# Patient Record
Sex: FEMALE | Race: BLACK OR AFRICAN AMERICAN | NOT HISPANIC OR LATINO | Employment: FULL TIME | URBAN - METROPOLITAN AREA
[De-identification: names, ages, dates, MRNs, and addresses within clinical notes are randomized per-mention and may not be internally consistent; named-entity substitution may affect disease eponyms.]

---

## 2018-09-04 ENCOUNTER — EVALUATION (OUTPATIENT)
Dept: PHYSICAL THERAPY | Facility: CLINIC | Age: 58
End: 2018-09-04
Payer: COMMERCIAL

## 2018-09-04 DIAGNOSIS — R53.1 WEAKNESS GENERALIZED: ICD-10-CM

## 2018-09-04 DIAGNOSIS — M54.12 CERVICAL RADICULOPATHY: Primary | ICD-10-CM

## 2018-09-04 PROCEDURE — 97162 PT EVAL MOD COMPLEX 30 MIN: CPT

## 2018-09-04 PROCEDURE — G8984 CARRY CURRENT STATUS: HCPCS

## 2018-09-04 PROCEDURE — 97112 NEUROMUSCULAR REEDUCATION: CPT

## 2018-09-04 PROCEDURE — G8985 CARRY GOAL STATUS: HCPCS

## 2018-09-04 RX ORDER — NAPROXEN 500 MG/1
250 TABLET ORAL 2 TIMES DAILY PRN
COMMUNITY

## 2018-09-04 RX ORDER — ESZOPICLONE 2 MG/1
1 TABLET, FILM COATED ORAL
COMMUNITY

## 2018-09-04 NOTE — PROGRESS NOTES
PT Evaluation     Today's date: 2018  Patient name: Lizzie Leone  : 1960  MRN: [id number]  Referring provider: Tristan Vasquez  Dx:   Encounter Diagnosis     ICD-10-CM    1  Cervical radiculopathy M54 12                   Assessment  Impairments: abnormal coordination, abnormal gait, abnormal muscle tone, abnormal or restricted ROM, activity intolerance, impaired balance, impaired physical strength, lacks appropriate home exercise program, pain with function, poor posture  and poor body mechanics    Assessment details: Lizzie Leone presents with signs and symptoms consistent with referring diagnosis  she demonstrates pain, decreased strength, decreased ROM, decreased joint mobility, impaired sensation, ambulatory dysfunction and postural  dysfunction  Recommend that she may benefit from PT in this setting in order to address the aforementioned impairments and restore Her ability to perform all prior activities without pain or difficulty  Should you have any questions regarding this patient's care, please contact [phone]    Thank you for your referral      Understanding of Dx/Px/POC: good   Prognosis: good    Goals  Short Term Goals to be completed within 3 weeks  Patient to demo good seated and standing posture without cues from PT 50% of the time  Patient able to improve ROM grossly by 15%  Patient able to demo correct transfer techniques with good body mechanics and min cues from PT  Patient able to tolerate sitting for 20 minutes without complaints of pain    Long Term Goals to be completed within 6 weeks  Patient to demo good seated and standing posture without cues from PT 80% of the time  Patient able to improve ROM grossly by 30%  Patient able to demo correct transfer techniques with good body mechanics and min cues from PT  Patient able to tolerate sitting for 40 minutes without complaints of pain  Patient to be independent with HEP  Patient's FOTO score to improve to 61/100      Plan  Patient would benefit from: skilled physical therapy  Planned modality interventions: thermotherapy: hydrocollator packs, cryotherapy and unattended electrical stimulation  Planned therapy interventions: manual therapy, joint mobilization, abdominal trunk stabilization, activity modification, balance, motor coordination training, neuromuscular re-education, patient education, postural training, body mechanics training, behavior modification, strengthening, stretching, therapeutic exercise, therapeutic activities, flexibility, functional ROM exercises and home exercise program  Frequency: 2x week  Duration in visits: 12  Duration in weeks: 6  Plan of Care beginning date: 2018  Plan of Care expiration date: 10/16/2018  Treatment plan discussed with: patient        Subjective Evaluation    History of Present Illness  Date of onset: 2018  Mechanism of injury: Patient reports that she had progressing R sided "heaviness" and weakness on her R arm and leg approximately 3 months ago with insidious onset  She notices a lot of tension in her shoulders that gets worse with sitting at the computer for her work  She works from home mostly, but she does have to go out into [employer] periodically ( 3x/month) and she does feel unsteady occasionally due to having a couple instances of rolling her R ankle  She presents primarily for the neck pain and R UE symptoms but is also hoping to work on her balance and walking as well as her R leg also feels "heavy"    Recurrent probem    Quality of life: good    Pain  Current pain ratin  At best pain ratin  At worst pain ratin  Quality: radiating, throbbing and tight  Relieving factors: change in position, ice, relaxation, rest and medications  Aggravating factors: sitting and keyboarding (Having to sit at her computer for hours at a time)    Social Support  Lives in: condominium  Lives with: alone    Employment status: working  Exercise history: Patient has had PT in past which has helped      Diagnostic Tests  No diagnostic tests performed  Abnormal MRI: "pinched nerve" in her neck from MRI within past year, but no recent imaging      FCE comments: No recent imaging performed, but has had MRI of brain and spine within past year that showed, per patient, "pinched nerves"Treatments  Previous treatment: physical therapy  Current treatment: physical therapy  Patient Goals  Patient goals for therapy: increased strength and decreased pain          Objective     Postural Observations  Seated posture: poor  Standing posture: poor  Correction of posture: makes symptoms better    Additional Postural Observation Details  (+)Dowager's hump    Active Range of Motion   Cervical/Thoracic Spine   Cervical  Subcranial protraction: Active cervical subcranial protraction: 20%   Flexion: WFL  Left lateral flexion: Neck active lateral bend left: 25% with pain  Right lateral flexion: Neck active lateral bend right: 20% with pain  Left rotation: Neck active rotation left: 50%   Right rotation: Neck active rotation right: 40% with pain    Joint Play Hypomobile: C2, C3, C4, C5, C6, C7 and T1 Pain: C5, C6 and C7     Strength/Myotome Testing     Left Shoulder     Planes of Motion   Flexion: 4   Abduction: 4   Adduction: 4+   External rotation at 0°: 4   Internal rotation at 0°: 4     Right Shoulder     Planes of Motion   Flexion: 4-   Abduction: 4-   Adduction: 4+   External rotation at 0°: 4-   Internal rotation at 0°: 4     Left Elbow   Flexion: 4+  Extension: 4+    Right Elbow   Flexion: 4+  Extension: 4+    Ambulation     Comments   R UE lacks arm swing, Minimal to nil trunk rotation during ambulation, decreased step length, absent push off and heel strike, widened base of support    Functional Assessment     Single Leg Stance   Left single leg stance time: Unable to perform without assist   Right single leg stance time: Unable to perform without assist           Precautions: MS    Daily Treatment Diary     Manual 9/4/18            Flexibility UE nerve glides 10x ea            STM B UT            Joint Mob                                           Exercise Diary  9/4/18            Chin tucks 5"x10            Scap squeezes 5"x10            Posture Re-ed 10 min                                                                                                                                                                                                                                             Modalities

## 2018-09-04 NOTE — LETTER
2018    [address] [place] [address]    Patient: Cindy Us   YOB: 1960   Date of Visit: 2018     Encounter Diagnosis     ICD-10-CM    1  Cervical radiculopathy M54 12    2  Weakness generalized R53 1        Dear Dr Barraza Push:    Please review the attached Plan of Care from [address] recent visit  Please verify that you agree therapy should continue by signing the attached document and sending it back to our office  If you have any questions or concerns, please don't hesitate to call  Sincerely,    Carolina Wise, PT      Referring Provider:      I certify that I have read the below Plan of Care and certify the need for these services furnished under this plan of treatment while under my care  Laine Oh  [address] [address]  V[address]          PT Evaluation     Today's date: 2018  Patient name: Cindy Us  : 1960  MRN: [id number]  Referring provider: Kirby Penn  Dx:   Encounter Diagnosis     ICD-10-CM    1  Cervical radiculopathy M54 12                   Assessment  Impairments: abnormal coordination, abnormal gait, abnormal muscle tone, abnormal or restricted ROM, activity intolerance, impaired balance, impaired physical strength, lacks appropriate home exercise program, pain with function, poor posture  and poor body mechanics    Assessment details: Cindy Us presents with signs and symptoms consistent with referring diagnosis  she demonstrates pain, decreased strength, decreased ROM, decreased joint mobility, impaired sensation, ambulatory dysfunction and postural  dysfunction  Recommend that she may benefit from PT in this setting in order to address the aforementioned impairments and restore Her ability to perform all prior activities without pain or difficulty  Should you have any questions regarding this patient's care, please contact [phone]    Thank you for your referral      Understanding of Dx/Px/POC: good   Prognosis: good    Goals  Short Term Goals to be completed within 3 weeks  Patient to demo good seated and standing posture without cues from PT 50% of the time  Patient able to improve ROM grossly by 15%  Patient able to demo correct transfer techniques with good body mechanics and min cues from PT  Patient able to tolerate sitting for 20 minutes without complaints of pain    Long Term Goals to be completed within 6 weeks  Patient to demo good seated and standing posture without cues from PT 80% of the time  Patient able to improve ROM grossly by 30%  Patient able to demo correct transfer techniques with good body mechanics and min cues from PT  Patient able to tolerate sitting for 40 minutes without complaints of pain  Patient to be independent with HEP  Patient's FOTO score to improve to 61/100      Plan  Patient would benefit from: skilled physical therapy  Planned modality interventions: thermotherapy: hydrocollator packs, cryotherapy and unattended electrical stimulation  Planned therapy interventions: manual therapy, joint mobilization, abdominal trunk stabilization, activity modification, balance, motor coordination training, neuromuscular re-education, patient education, postural training, body mechanics training, behavior modification, strengthening, stretching, therapeutic exercise, therapeutic activities, flexibility, functional ROM exercises and home exercise program  Frequency: 2x week  Duration in visits: 12  Duration in weeks: 6  Plan of Care beginning date: 9/4/2018  Plan of Care expiration date: 10/16/2018  Treatment plan discussed with: patient        Subjective Evaluation    History of Present Illness  Date of onset: 6/4/2018  Mechanism of injury: Patient reports that she had progressing R sided "heaviness" and weakness on her R arm and leg approximately 3 months ago with insidious onset   She notices a lot of tension in her shoulders that gets worse with sitting at the computer for her work  She works from home mostly, but she does have to go out into [place] periodically ( 3x/month) and she does feel unsteady occasionally due to having a couple instances of rolling her R ankle  She presents primarily for the neck pain and R UE symptoms but is also hoping to work on her balance and walking as well as her R leg also feels "heavy"  Recurrent probem    Quality of life: good    Pain  Current pain ratin  At best pain ratin  At worst pain ratin  Quality: radiating, throbbing and tight  Relieving factors: change in position, ice, relaxation, rest and medications  Aggravating factors: sitting and keyboarding (Having to sit at her computer for hours at a time)    Social Support  Lives in: condominium  Lives with: alone    Employment status: working  Exercise history: Patient has had PT in past which has helped      Diagnostic Tests  No diagnostic tests performed  Abnormal MRI: "pinched nerve" in her neck from MRI within past year, but no recent imaging      FCE comments: No recent imaging performed, but has had MRI of brain and spine within past year that showed, per patient, "pinched nerves"Treatments  Previous treatment: physical therapy  Current treatment: physical therapy  Patient Goals  Patient goals for therapy: increased strength and decreased pain          Objective     Postural Observations  Seated posture: poor  Standing posture: poor  Correction of posture: makes symptoms better    Additional Postural Observation Details  (+)Dowager's hump    Active Range of Motion   Cervical/Thoracic Spine   Cervical  Subcranial protraction: Active cervical subcranial protraction: 20%   Flexion: WFL  Left lateral flexion: Neck active lateral bend left: 25% with pain  Right lateral flexion: Neck active lateral bend right: 20% with pain  Left rotation: Neck active rotation left: 50%   Right rotation: Neck active rotation right: 40% with pain    Joint Play Hypomobile: C2, C3, C4, C5, C6, C7 and T1 Pain: C5, C6 and C7     Strength/Myotome Testing     Left Shoulder     Planes of Motion   Flexion: 4   Abduction: 4   Adduction: 4+   External rotation at 0°:  4   Internal rotation at 0°:  4     Right Shoulder     Planes of Motion   Flexion: 4-   Abduction: 4-   Adduction: 4+   External rotation at 0°:  4-   Internal rotation at 0°:  4     Left Elbow   Flexion: 4+  Extension: 4+    Right Elbow   Flexion: 4+  Extension: 4+    Ambulation     Comments   R UE lacks arm swing, Minimal to nil trunk rotation during ambulation, decreased step length, absent push off and heel strike, widened base of support    Functional Assessment     Single Leg Stance   Left single leg stance time: Unable to perform without assist   Right single leg stance time: Unable to perform without assist           Precautions: MS    Daily Treatment Diary     Manual  9/4/18            Flexibility UE nerve glides 10x ea            STM B UT            Joint Mob                                           Exercise Diary  9/4/18            Chin tucks 5"x10            Scap squeezes 5"x10            Posture Re-ed 10 min                                                                                                                                                                                                                                             Modalities

## 2018-09-05 ENCOUNTER — TRANSCRIBE ORDERS (OUTPATIENT)
Dept: PHYSICAL THERAPY | Facility: CLINIC | Age: 58
End: 2018-09-05

## 2018-09-05 DIAGNOSIS — M54.12 CERVICAL RADICULOPATHY: Primary | ICD-10-CM

## 2018-09-06 ENCOUNTER — OFFICE VISIT (OUTPATIENT)
Dept: PHYSICAL THERAPY | Facility: CLINIC | Age: 58
End: 2018-09-06
Payer: COMMERCIAL

## 2018-09-06 DIAGNOSIS — M54.12 CERVICAL RADICULOPATHY: Primary | ICD-10-CM

## 2018-09-06 DIAGNOSIS — R53.1 WEAKNESS GENERALIZED: ICD-10-CM

## 2018-09-06 PROCEDURE — 97140 MANUAL THERAPY 1/> REGIONS: CPT

## 2018-09-06 PROCEDURE — 97110 THERAPEUTIC EXERCISES: CPT

## 2018-09-06 NOTE — PROGRESS NOTES
Daily Note     Today's date: 2018  Patient name: Natali Pino  : 1960  MRN: [id number]  Referring provider: Joi Beasley MD  Dx:   Encounter Diagnosis     ICD-10-CM    1  Cervical radiculopathy M54 12    2  Weakness generalized R53 1                   Subjective: Patient reports that she feels a little less tight today and she has been adjusting her posture throughout her day more which seems to be helping  Pain level today on arrival       Objective: See treatment diary below  Precautions: MS    Daily Treatment Diary     Manual  18           Flexibility UE nerve glides 10x ea B UT, scalenes           STM B UT B UT, suboccipital release, R levator scap release, R SCM STM           Joint Mob  PA mobs C/S Gr II, III                                         Exercise Diary  18           Chin tucks 5"x10 5"x10           Scap squeezes 5"x10 5"x 20           Posture Re-ed 10 min            UBE  Rev x5min           Pulleys  x4 min elevation           B UE ER  Red TB 2x15                                                                                                                                                                                                     Modalities              CP  x10min post trx                                           Assessment: Tolerated treatment well and without distress  She requires intermittent rest breaks between exercises and tactile cues to avoid upper trap over-activation during posture exercises  Pain levels decreased by conclusion of session, less TTP to B UT after manual treatment performed  Patient demonstrated fatigue post treatment and would benefit from continued PT  Plan: Continue per plan of care

## 2018-09-11 ENCOUNTER — OFFICE VISIT (OUTPATIENT)
Dept: PHYSICAL THERAPY | Facility: CLINIC | Age: 58
End: 2018-09-11
Payer: COMMERCIAL

## 2018-09-11 DIAGNOSIS — M54.12 CERVICAL RADICULOPATHY: Primary | ICD-10-CM

## 2018-09-11 DIAGNOSIS — R53.1 WEAKNESS GENERALIZED: ICD-10-CM

## 2018-09-11 PROCEDURE — 97110 THERAPEUTIC EXERCISES: CPT

## 2018-09-11 PROCEDURE — 97140 MANUAL THERAPY 1/> REGIONS: CPT

## 2018-09-11 NOTE — PROGRESS NOTES
Daily Note     Today's date: 2018  Patient name: Gerry Darling  : 1960  MRN: [id number]  Referring provider: Buster Sparks MD  Dx:   Encounter Diagnosis     ICD-10-CM    1  Cervical radiculopathy M54 12    2  Weakness generalized R53 1                   Subjective: Patient reports that she has been in a lot of pain over the weekend due to a flare up of gout in her R foot  She had difficulty putting any weight on the R foot  She is now on medication so it is improving, but her lack of mobility over the weekend tightened up her neck  She also reports that her carpal tunnel syndrome has been bad the past few days and is now wearing her wrist brace on the R hand  Objective: See treatment diary below  Precautions: MS    Daily Treatment Diary     Manual  18          Flexibility UE nerve glides 10x ea B UT, scalenes B UT, scalenes          STM B UT B UT, suboccipital release, R levator scap release, R SCM STM B UT, suboccipital release, R levator scap release, R SCM STM          Joint Mob  PA mobs C/S Gr II, III PA mobs C/S Gr II, III                                        Exercise Diary  18          Chin tucks 5"x10 5"x10 5"x15          Scap squeezes 5"x10 5"x 20 5"x20          Posture Re-ed 10 min            UBE  Rev x5min Rev 5 min          Pulleys  x4 min elevation x4 min elevation          B UE ER  Red TB 2x15 Red TB 2x15 in supine                                                                                                                                                                                                    Modalities              CP x10min post trx x10min post trx                                           Assessment: Tolerated treatment well and with minimal distress  She continues to have increased tissue tension in the upper trap musculature, addressed with manual techniques and posture cues    She also complains of paresthesias throughout the entirety of her R hand without centralization or peripherlization patterns occurring throughout session  Performed B UE ER in supine position today, as she was more fatigued and in more pain today due to gout  Patient demonstrated fatigue post treatment and would benefit from continued PT in this setting  Plan: Continue per plan of care

## 2018-09-13 ENCOUNTER — OFFICE VISIT (OUTPATIENT)
Dept: PHYSICAL THERAPY | Facility: CLINIC | Age: 58
End: 2018-09-13
Payer: COMMERCIAL

## 2018-09-13 DIAGNOSIS — R53.1 WEAKNESS GENERALIZED: ICD-10-CM

## 2018-09-13 DIAGNOSIS — M54.12 CERVICAL RADICULOPATHY: Primary | ICD-10-CM

## 2018-09-13 PROCEDURE — 97140 MANUAL THERAPY 1/> REGIONS: CPT

## 2018-09-13 PROCEDURE — 97110 THERAPEUTIC EXERCISES: CPT

## 2018-09-13 NOTE — PROGRESS NOTES
Daily Note     Today's date: 2018  Patient name: Cindy Us  : 1960  MRN: [id number]  Referring provider: Kirby Penn MD  Dx:   Encounter Diagnosis     ICD-10-CM    1  Cervical radiculopathy M54 12    2  Weakness generalized R53 1                   Subjective: Patient reports that she has felt better since last session  She still has some mild numbness in the R hand, but overall still feeling better  Pain on arrival 4/10  Objective: See treatment diary below  Precautions: MS    Daily Treatment Diary     Manual  18         Flexibility UE nerve glides 10x ea B UT, scalenes B UT, scalenes B UT, scalenes, UE nerve glides 15x         STM B UT B UT, suboccipital release, R levator scap release, R SCM STM B UT, suboccipital release, R levator scap release, R SCM STM B UT, suboccipital release, R levator scap release, R SCM STM         Joint Mob  PA mobs C/S Gr II, III PA mobs C/S Gr II, III PA mobs C/S Gr II, III                                       Exercise Diary  18         Chin tucks 5"x10 5"x10 5"x15 5"x15         Scap squeezes 5"x10 5"x 20 5"x20 5"x20         Posture Re-ed 10 min            UBE  Rev x5min Rev 5 min Rev/Fwd 4'/4'         Pulleys  x4 min elevation x4 min elevation x4 min elevation         B UE ER  Red TB 2x15 Red TB 2x15 in supine Red TB 2x15                                                                                                                                                                                                   Modalities              CP x10min post trx x10min post trx                                           Assessment: Tolerated treatment well and without distress  She was able to tolerate longer time on the UBE without excessive fatigue  She responds well to manual treatment and nerve glides  Pain reported as /10 by conclusion of session   Patient demonstrated fatigue post treatment, exhibited good technique with therapeutic exercises and would benefit from continued PT      Plan: Continue per plan of care

## 2018-09-18 ENCOUNTER — OFFICE VISIT (OUTPATIENT)
Dept: PHYSICAL THERAPY | Facility: CLINIC | Age: 58
End: 2018-09-18
Payer: COMMERCIAL

## 2018-09-18 DIAGNOSIS — M54.12 CERVICAL RADICULOPATHY: Primary | ICD-10-CM

## 2018-09-18 DIAGNOSIS — R53.1 WEAKNESS GENERALIZED: ICD-10-CM

## 2018-09-18 PROCEDURE — 97110 THERAPEUTIC EXERCISES: CPT

## 2018-09-18 PROCEDURE — 97140 MANUAL THERAPY 1/> REGIONS: CPT

## 2018-09-18 NOTE — PROGRESS NOTES
Daily Note     Today's date: 2018  Patient name: Natali Pino  : 1960  MRN: [id number]  Referring provider: Joi Beasley MD  Dx:   Encounter Diagnosis     ICD-10-CM    1  Cervical radiculopathy M54 12    2  Weakness generalized R53 1                   Subjective: Patient reports that she still has some mild R hand symptoms, but it is less intense than it was previously  Intensity /10 on arrival        Objective: See treatment diary below  Daily Treatment Diary     Manual  18        Flexibility UE nerve glides 10x ea B UT, scalenes B UT, scalenes B UT, scalenes, UE nerve glides 15x B UT, scalenes, UE nerve glides 15x        STM B UT B UT, suboccipital release, R levator scap release, R SCM STM B UT, suboccipital release, R levator scap release, R SCM STM B UT, suboccipital release, R levator scap release, R SCM STM B UT, suboccipital release, R levator scap release, R SCM STM        Joint Mob  PA mobs C/S Gr II, III PA mobs C/S Gr II, III PA mobs C/S Gr II, III PA mobs C/S Gr III, IV                                      Exercise Diary  18        Chin tucks 5"x10 5"x10 5"x15 5"x15 5"x15        Scap squeezes 5"x10 5"x 20 5"x20 5"x20 5"x15        Posture Re-ed 10 min            UBE  Rev x5min Rev 5 min Rev/Fwd 4'/4' Rev/Fwd 4'/4'        Pulleys  x4 min elevation x4 min elevation x4 min elevation x4 min elevation        B UE ER  Red TB 2x15 Red TB 2x15 in supine Red TB 2x15 Red TB 2x15                                                                                                                                                                                                  Modalities              CP x10min post trx x10min post trx                                           Assessment: Tolerated treatment well   Patient demonstrated fatigue post treatment, exhibited good technique with therapeutic exercises and would benefit from continued PT in this setting  She demonstrates moderate to severe C/S joint hypomoblity       Plan: Continue per plan of care

## 2018-09-20 ENCOUNTER — OFFICE VISIT (OUTPATIENT)
Dept: PHYSICAL THERAPY | Facility: CLINIC | Age: 58
End: 2018-09-20
Payer: COMMERCIAL

## 2018-09-20 DIAGNOSIS — R53.1 WEAKNESS GENERALIZED: ICD-10-CM

## 2018-09-20 DIAGNOSIS — M54.12 CERVICAL RADICULOPATHY: Primary | ICD-10-CM

## 2018-09-20 PROCEDURE — 97140 MANUAL THERAPY 1/> REGIONS: CPT

## 2018-09-20 PROCEDURE — 97110 THERAPEUTIC EXERCISES: CPT

## 2018-09-20 NOTE — PROGRESS NOTES
Daily Note     Today's date: 2018  Patient name: Kellee Marks  : 1960  MRN: [id number]  Referring provider: Alcides Tang MD  Dx:   Encounter Diagnosis     ICD-10-CM    1  Cervical radiculopathy M54 12    2  Weakness generalized R53 1                   Subjective: Patient reports that she can feel her R hand a little more today than usual, but it is still decreased as compared to her L side         Objective: See treatment diary below  Daily Treatment Diary     Manual  18       Flexibility UE nerve glides 10x ea B UT, scalenes B UT, scalenes B UT, scalenes, UE nerve glides 15x B UT, scalenes, UE nerve glides 15x B UT, scalenes, UE nerve glides 15x       STM B UT B UT, suboccipital release, R levator scap release, R SCM STM B UT, suboccipital release, R levator scap release, R SCM STM B UT, suboccipital release, R levator scap release, R SCM STM B UT, suboccipital release, R levator scap release, R SCM STM B UT, suboccipital release, R levator scap release, R SCM STM       Joint Mob  PA mobs C/S Gr II, III PA mobs C/S Gr II, III PA mobs C/S Gr II, III PA mobs C/S Gr III, IV PA mobs C/S Gr III, IV                                     Exercise Diary  18       Chin tucks 5"x10 5"x10 5"x15 5"x15 5"x15 5"x15       Scap squeezes 5"x10 5"x 20 5"x20 5"x20 5"x15 5"x15       Posture Re-ed 10 min            UBE  Rev x5min Rev 5 min Rev/Fwd 4'/4' Rev/Fwd 4'/4' Rev/Fwd 4'/4'       Pulleys  x4 min elevation x4 min elevation x4 min elevation x4 min elevation x4 min elevation       B UE ER  Red TB 2x15 Red TB 2x15 in supine Red TB 2x15 Red TB 2x15 Red TB 2x15                                                                                                                                                                                                 Modalities              CP x10min post trx x10min post trx Assessment: Tolerated treatment well and without distress  Patient demonstrated fatigue post treatment, exhibited good technique with therapeutic exercises and would benefit from continued PT in order to improve posture and decrease pain and muscle tension in neck  Plan: Continue per plan of care

## 2018-09-25 ENCOUNTER — OFFICE VISIT (OUTPATIENT)
Dept: PHYSICAL THERAPY | Facility: CLINIC | Age: 58
End: 2018-09-25
Payer: COMMERCIAL

## 2018-09-25 DIAGNOSIS — R53.1 WEAKNESS GENERALIZED: ICD-10-CM

## 2018-09-25 DIAGNOSIS — M54.12 CERVICAL RADICULOPATHY: Primary | ICD-10-CM

## 2018-09-25 PROCEDURE — 97112 NEUROMUSCULAR REEDUCATION: CPT

## 2018-09-25 PROCEDURE — 97110 THERAPEUTIC EXERCISES: CPT

## 2018-09-25 NOTE — PROGRESS NOTES
Daily Note     Today's date: 2018  Patient name: Wade Montgomery  : 1960  MRN: [id number]  Referring provider: Selma Epley, MD  Dx:   Encounter Diagnosis     ICD-10-CM    1  Cervical radiculopathy M54 12    2  Weakness generalized R53 1                   Subjective: Patient reports that she feels like she is improving slowly  She has some sensation in her R hand, though had some tingling in the R side of her neck  Overall she is happy with her progress       Objective: See treatment diary below  Daily Treatment Diary     Manual  18      Flexibility UE nerve glides 10x ea B UT, scalenes B UT, scalenes B UT, scalenes, UE nerve glides 15x B UT, scalenes, UE nerve glides 15x B UT, scalenes, UE nerve glides 15x       STM B UT B UT, suboccipital release, R levator scap release, R SCM STM B UT, suboccipital release, R levator scap release, R SCM STM B UT, suboccipital release, R levator scap release, R SCM STM B UT, suboccipital release, R levator scap release, R SCM STM B UT, suboccipital release, R levator scap release, R SCM STM       Joint Mob  PA mobs C/S Gr II, III PA mobs C/S Gr II, III PA mobs C/S Gr II, III PA mobs C/S Gr III, IV PA mobs C/S Gr III, IV                                     Exercise Diary  18      Chin tucks 5"x10 5"x10 5"x15 5"x15 5"x15 5"x15 5"x15      Scap squeezes 5"x10 5"x 20 5"x20 5"x20 5"x15 5"x15 5"x15      Posture Re-ed 10 min            UBE  Rev x5min Rev 5 min Rev/Fwd 4'/4' Rev/Fwd 4'/4' Rev/Fwd 4'/4' Rev/Fwd 4'/4'      Pulleys  x4 min elevation x4 min elevation x4 min elevation x4 min elevation x4 min elevation x4 min elevation      B UE ER  Red TB 2x15 Red TB 2x15 in supine Red TB 2x15 Red TB 2x15 Red TB 2x15 Red TB 2x15      B UE Abd       supine Red 2x10      Rows       Tcord 3x10      Shoulder extension with scap squeeze       3" holdx 20, yellow Modalities              CP x10min post trx x10min post trx                                           Assessment: Tolerated treatment well  Patient demonstrated fatigue post treatment, exhibited good technique with therapeutic exercises and would benefit from continued PT in this setting to increase endurance of postural muscles  Plan: Continue per plan of care

## 2018-09-27 ENCOUNTER — APPOINTMENT (OUTPATIENT)
Dept: PHYSICAL THERAPY | Facility: CLINIC | Age: 58
End: 2018-09-27
Payer: COMMERCIAL

## 2018-10-02 ENCOUNTER — APPOINTMENT (OUTPATIENT)
Dept: PHYSICAL THERAPY | Facility: CLINIC | Age: 58
End: 2018-10-02
Payer: COMMERCIAL

## 2018-10-04 ENCOUNTER — OFFICE VISIT (OUTPATIENT)
Dept: PHYSICAL THERAPY | Facility: CLINIC | Age: 58
End: 2018-10-04
Payer: COMMERCIAL

## 2018-10-04 DIAGNOSIS — M54.12 CERVICAL RADICULOPATHY: Primary | ICD-10-CM

## 2018-10-04 DIAGNOSIS — R53.1 WEAKNESS GENERALIZED: ICD-10-CM

## 2018-10-04 PROCEDURE — 97110 THERAPEUTIC EXERCISES: CPT

## 2018-10-04 PROCEDURE — 97140 MANUAL THERAPY 1/> REGIONS: CPT

## 2018-10-04 NOTE — PROGRESS NOTES
Daily Note     Today's date: 10/4/2018  Patient name: Jim Pardo  : 1960  MRN: [id number]  Referring provider: Ann Espinosa MD  Dx:   Encounter Diagnosis     ICD-10-CM    1  Cervical radiculopathy M54 12    2  Weakness generalized R53 1                   Subjective: Patient reports that she still isn't feeling very well today but is now on antibiotics          Objective: See treatment diary below  Daily Treatment Diary     Manual  9/4/18 9/6/18 9/11/18 9/13/18 9/18/18 9/20/18 9/25/18 10/4/18     Flexibility UE nerve glides 10x ea B UT, scalenes B UT, scalenes B UT, scalenes, UE nerve glides 15x B UT, scalenes, UE nerve glides 15x B UT, scalenes, UE nerve glides 15x  B UT, scalenes, UE nerve glides 15x     STM B UT B UT, suboccipital release, R levator scap release, R SCM STM B UT, suboccipital release, R levator scap release, R SCM STM B UT, suboccipital release, R levator scap release, R SCM STM B UT, suboccipital release, R levator scap release, R SCM STM B UT, suboccipital release, R levator scap release, R SCM STM  B UT, suboccipital release, R levator scap release, R SCM STM     Joint Mob  PA mobs C/S Gr II, III PA mobs C/S Gr II, III PA mobs C/S Gr II, III PA mobs C/S Gr III, IV PA mobs C/S Gr III, IV  PA mobs C/S Gr III, IV                                   Exercise Diary  9/4/18 9/6/18 9/11/18 9/13/18 9/18/18 9/20/18 9/25/18 10/4/18     Chin tucks 5"x10 5"x10 5"x15 5"x15 5"x15 5"x15 5"x15 5"x15     Scap squeezes 5"x10 5"x 20 5"x20 5"x20 5"x15 5"x15 5"x15 5"x15     Posture Re-ed 10 min            UBE  Rev x5min Rev 5 min Rev/Fwd 4'/4' Rev/Fwd 4'/4' Rev/Fwd 4'/4' Rev/Fwd 4'/4' Rev/Fwd 4'/4'     Pulleys  x4 min elevation x4 min elevation x4 min elevation x4 min elevation x4 min elevation x4 min elevation x4 min elevation     B UE ER  Red TB 2x15 Red TB 2x15 in supine Red TB 2x15 Red TB 2x15 Red TB 2x15 Red TB 2x15      B UE Abd       supine Red 2x10      Rows       Tcord 3x10      Shoulder extension with scap squeeze       3" holdx 20, yellow                                                                                                                                                         Modalities              CP x10min post trx x10min post trx                                           Assessment: Tolerated treatment well and without distress  She requested abbreviated session today, as she is not feeling well  She was able to tolerate light TE's and manual treatment  Patient demonstrated fatigue post treatment, exhibited good technique with therapeutic exercises and would benefit from continued PT in this setting, resume other exercises next session as tolerated  Plan: Continue per plan of care

## 2018-10-16 ENCOUNTER — OFFICE VISIT (OUTPATIENT)
Dept: PHYSICAL THERAPY | Facility: CLINIC | Age: 58
End: 2018-10-16
Payer: COMMERCIAL

## 2018-10-16 DIAGNOSIS — R53.1 WEAKNESS GENERALIZED: ICD-10-CM

## 2018-10-16 DIAGNOSIS — M54.12 CERVICAL RADICULOPATHY: Primary | ICD-10-CM

## 2018-10-16 PROCEDURE — 97140 MANUAL THERAPY 1/> REGIONS: CPT

## 2018-10-16 PROCEDURE — 97110 THERAPEUTIC EXERCISES: CPT

## 2018-10-16 NOTE — PROGRESS NOTES
Daily Note     Today's date: 10/16/2018  Patient name: Elisa Wellington  : 1960  MRN: [id number]  Referring provider: Anoop Jacobsen MD  Dx:   Encounter Diagnosis     ICD-10-CM    1  Cervical radiculopathy M54 12    2  Weakness generalized R53 1                   Subjective: Patient reports that she is struggling with another episode of gout, for which she is being treated again, but this time it is affecting the L foot  She has slightly more sensation in her R hand but still gets numbness in the R hand  She also notes that she has been under a lot of stress lately and feels it also contributes to her neck tension and symptoms         Objective: See treatment diary below  Daily Treatment Diary     Manual  9/4/18 9/6/18 9/11/18 9/13/18 9/18/18 9/20/18 9/25/18 10/4/18 10/16/18    Flexibility UE nerve glides 10x ea B UT, scalenes B UT, scalenes B UT, scalenes, UE nerve glides 15x B UT, scalenes, UE nerve glides 15x B UT, scalenes, UE nerve glides 15x  B UT, scalenes, UE nerve glides 15x B UT, scalenes, UE nerve glides 15x    STM B UT B UT, suboccipital release, R levator scap release, R SCM STM B UT, suboccipital release, R levator scap release, R SCM STM B UT, suboccipital release, R levator scap release, R SCM STM B UT, suboccipital release, R levator scap release, R SCM STM B UT, suboccipital release, R levator scap release, R SCM STM  B UT, suboccipital release, R levator scap release, R SCM STM B UT, suboccipital release, R levator scap release, R SCM STM    Joint Mob  PA mobs C/S Gr II, III PA mobs C/S Gr II, III PA mobs C/S Gr II, III PA mobs C/S Gr III, IV PA mobs C/S Gr III, IV  PA mobs C/S Gr III, IV PA mobs C/S Gr III, IV                                  Exercise Diary  18/25/18 10/4/18 10/1618    Chin tucks 5"x10 5"x10 5"x15 5"x15 5"x15 5"x15 5"x15 5"x15 5"x15    Scap squeezes 5"x10 5"x 20 5"x20 5"x20 5"x15 5"x15 5"x15 5"x15 5"x15    Posture Re-ed 10 min UBE  Rev x5min Rev 5 min Rev/Fwd 4'/4' Rev/Fwd 4'/4' Rev/Fwd 4'/4' Rev/Fwd 4'/4' Rev/Fwd 4'/4' Rev/Fwd 4'/4'    Pulleys  x4 min elevation x4 min elevation x4 min elevation x4 min elevation x4 min elevation x4 min elevation x4 min elevation x4 min elevation    B UE ER  Red TB 2x15 Red TB 2x15 in supine Red TB 2x15 Red TB 2x15 Red TB 2x15 Red TB 2x15  Red TB 2x15    B UE Abd       supine Red 2x10  Red TB 2x15    Rows       Tcord 3x10  Tcord 3x10 yellow    Shoulder extension with scap squeeze       3" holdx 20, yellow  3" holdx 20, yellow                                                                                                                                                       Modalities              CP x10min post trx x10min post trx                                           Assessment: Tolerated treatment well and without distress  She reported increase sensation in the R hand after performing chin tucks and manual traction  Patient demonstrated fatigue post treatment and would benefit from continued PT in this setting to improve overall postural endurance and improve overall strength and mobility  Plan: Continue per plan of care

## 2018-10-18 ENCOUNTER — OFFICE VISIT (OUTPATIENT)
Dept: PHYSICAL THERAPY | Facility: CLINIC | Age: 58
End: 2018-10-18
Payer: COMMERCIAL

## 2018-10-18 DIAGNOSIS — R53.1 WEAKNESS GENERALIZED: Primary | ICD-10-CM

## 2018-10-18 DIAGNOSIS — M54.12 CERVICAL RADICULOPATHY: ICD-10-CM

## 2018-10-18 PROCEDURE — 97110 THERAPEUTIC EXERCISES: CPT

## 2018-10-18 PROCEDURE — 97140 MANUAL THERAPY 1/> REGIONS: CPT

## 2018-10-18 NOTE — PROGRESS NOTES
Daily Note     Today's date: 10/18/2018  Patient name: Gail Hernández  : 1960  MRN: [id number]  Referring provider: Des Clemens MD  Dx:   Encounter Diagnosis     ICD-10-CM    1  Weakness generalized R53 1    2  Cervical radiculopathy M54 12                   Subjective: Patient reports that she was working on the computer for several hours this morning and so she is wearing her carpal tunnel brace on her R wrist   She feels overall like PT is helping her and states she always feels better after she leaves         Objective: See treatment diary below  Daily Treatment Diary     Manual  9/4/18 9/6/18 9/11/18 9/13/18 9/18/18 9/20/18 9/25/18 10/4/18 10/16/18 10/18   Flexibility UE nerve glides 10x ea B UT, scalenes B UT, scalenes B UT, scalenes, UE nerve glides 15x B UT, scalenes, UE nerve glides 15x B UT, scalenes, UE nerve glides 15x  B UT, scalenes, UE nerve glides 15x B UT, scalenes, UE nerve glides 15x B UT, scalenes, UE nerve glides 15x   STM B UT B UT, suboccipital release, R levator scap release, R SCM STM B UT, suboccipital release, R levator scap release, R SCM STM B UT, suboccipital release, R levator scap release, R SCM STM B UT, suboccipital release, R levator scap release, R SCM STM B UT, suboccipital release, R levator scap release, R SCM STM  B UT, suboccipital release, R levator scap release, R SCM STM B UT, suboccipital release, R levator scap release, R SCM STM B UT, suboccipital release, R levator scap release, R SCM STM   Joint Mob  PA mobs C/S Gr II, III PA mobs C/S Gr II, III PA mobs C/S Gr II, III PA mobs C/S Gr III, IV PA mobs C/S Gr III, IV  PA mobs C/S Gr III, IV PA mobs C/S Gr III, IV PA mobs C/S Gr III, IV                                 Exercise Diary  9/4/18 9/6/18 9/11/18 9/13/18 9/18/18 9/20/18 9/25/18 10/4/18 10/1618 10/18   Chin tucks 5"x10 5"x10 5"x15 5"x15 5"x15 5"x15 5"x15 5"x15 5"x15 5"x15   Scap squeezes 5"x10 5"x 20 5"x20 5"x20 5"x15 5"x15 5"x15 5"x15 5"x15 5"x15   Posture Re-ed 10 min            UBE  Rev x5min Rev 5 min Rev/Fwd 4'/4' Rev/Fwd 4'/4' Rev/Fwd 4'/4' Rev/Fwd 4'/4' Rev/Fwd 4'/4' Rev/Fwd 4'/4' Rev/Fwd 4'/4'   Pulleys  x4 min elevation x4 min elevation x4 min elevation x4 min elevation x4 min elevation x4 min elevation x4 min elevation x4 min elevation x4 min elevation   B UE ER  Red TB 2x15 Red TB 2x15 in supine Red TB 2x15 Red TB 2x15 Red TB 2x15 Red TB 2x15  Red TB 2x15 Green TB 2x15   B UE Abd       supine Red 2x10  Red TB 2x15 Green TB 2x15   Rows       Tcord 3x10  Tcord 3x10 yellow Tcord 3x10 blue   Shoulder extension with scap squeeze       3" holdx 20, yellow  3" holdx 20, yellow 3" holdx 20, blue                                                                                                                                                      Modalities              CP x10min post trx x10min post trx                                           Assessment: Tolerated treatment well  Patient demonstrated fatigue post treatment and would benefit from continued PT  She reported improved sensation in the R hand after manual distraction performed  Plan: Continue per plan of care  Progress note during next visit

## 2018-10-23 ENCOUNTER — OFFICE VISIT (OUTPATIENT)
Dept: PHYSICAL THERAPY | Facility: CLINIC | Age: 58
End: 2018-10-23
Payer: COMMERCIAL

## 2018-10-23 ENCOUNTER — TRANSCRIBE ORDERS (OUTPATIENT)
Dept: PHYSICAL THERAPY | Facility: CLINIC | Age: 58
End: 2018-10-23

## 2018-10-23 DIAGNOSIS — M54.12 CERVICAL RADICULOPATHY: ICD-10-CM

## 2018-10-23 DIAGNOSIS — R53.1 ASTHENIA: Primary | ICD-10-CM

## 2018-10-23 DIAGNOSIS — R53.1 WEAKNESS GENERALIZED: Primary | ICD-10-CM

## 2018-10-23 DIAGNOSIS — M54.12 BRACHIAL NEURITIS: ICD-10-CM

## 2018-10-23 PROCEDURE — 97140 MANUAL THERAPY 1/> REGIONS: CPT

## 2018-10-23 PROCEDURE — G8985 CARRY GOAL STATUS: HCPCS

## 2018-10-23 PROCEDURE — G8984 CARRY CURRENT STATUS: HCPCS

## 2018-10-23 NOTE — PROGRESS NOTES
PT Re-Evaluation     Today's date: 10/23/2018  Patient name: Chelsea Bertrand  : 1960  MRN: [id number]  Referring provider: Emma Pettit MD  Dx:   Encounter Diagnosis     ICD-10-CM    1  Weakness generalized R53 1    2  Cervical radiculopathy M54 12                   Assessment  Impairments: abnormal coordination, abnormal gait, abnormal muscle tone, abnormal or restricted ROM, activity intolerance, impaired balance, impaired physical strength, lacks appropriate home exercise program, pain with function, poor posture  and poor body mechanics    Assessment details: Chelsea Bertrand has made significant progress since beginning PT treatment and has attended 11 sessions since her evaluation on 18  She demonstrates improving degrees of pain, but continues to have decreased strength, decreased cervical ROM, decreased cervical joint mobility, intermittent impaired sensation, ambulatory dysfunction and postural  dysfunction  Recommend that she may benefit from continuing PT in this setting 2x/wk in order to address the aforementioned impairments and restore Her ability to perform all prior activities without pain or difficulty  Should you have any questions regarding this patient's care, please contact [phone]    Thank you for your referral      Understanding of Dx/Px/POC: good   Prognosis: good    Goals  Short Term Goals to be completed within 3 weeks  Patient to demo good seated and standing posture without cues from PT 50% of the time  Patient able to improve ROM grossly by 15%  Patient able to demo correct transfer techniques with good body mechanics and min cues from PT  Patient able to tolerate sitting for 20 minutes without complaints of pain    Long Term Goals to be completed within 6 weeks  Patient to demo good seated and standing posture without cues from PT 80% of the time  Patient able to improve ROM grossly by 30%  Patient able to demo correct transfer techniques with good body mechanics and min cues from PT  Patient able to tolerate sitting for 40 minutes without complaints of pain  Patient to be independent with HEP  Patient's FOTO score to improve to 61/100      Plan  Patient would benefit from: skilled physical therapy  Planned modality interventions: thermotherapy: hydrocollator packs, cryotherapy and unattended electrical stimulation  Planned therapy interventions: manual therapy, joint mobilization, abdominal trunk stabilization, activity modification, balance, motor coordination training, neuromuscular re-education, patient education, postural training, body mechanics training, behavior modification, strengthening, stretching, therapeutic exercise, therapeutic activities, flexibility, functional ROM exercises and home exercise program  Frequency: 2x week  Duration in visits: 12  Duration in weeks: 6  Plan of Care beginning date: 10/16/2018  Plan of Care expiration date: 2018  Treatment plan discussed with: patient        Subjective Evaluation    History of Present Illness  Date of onset: 2018  Mechanism of injury: Patient reports that she feels that physical therapy has helped her to this point  She now has more sensation in her R hand, though still feels "heaviness" and weakness in her R side in general but even this is improved somewhat  She states that she still feels at least 50% better since she began treatment and is happy with her progress  She would like to continue PT treatment at this time       Recurrent probem    Quality of life: good    Pain  Current pain ratin  At best pain ratin  At worst pain ratin  Location: neck and R arm, though it is very occasional  Quality: radiating, throbbing and tight  Relieving factors: change in position, ice, relaxation, rest and medications  Aggravating factors: sitting and keyboarding (Having to sit at her computer for hours at a time)    Social Support  Lives in: condominium  Lives with: alone    Employment status: working  Exercise history: Patient has had PT in past which has helped      Diagnostic Tests  No diagnostic tests performed  Abnormal MRI: "pinched nerve" in her neck from MRI within past year, but no recent imaging  FCE comments: No recent imaging performed, but has had MRI of brain and spine within past year that showed, per patient, "pinched nerves"Treatments  Previous treatment: physical therapy  Current treatment: physical therapy  Patient Goals  Patient goals for therapy: increased strength and decreased pain          Objective     Postural Observations  Seated posture: poor  Standing posture: poor  Correction of posture: makes symptoms better    Additional Postural Observation Details  (+)Dowager's hump    Neurological Testing     Sensation   Cervical/Thoracic   Left   Intact: light touch    Right   Diminished: light touch    Comments   Right light touch: Able to identify areas touched but it is lessened as compared to L side       Active Range of Motion   Cervical/Thoracic Spine   Cervical  Subcranial protraction: Active cervical subcranial protraction: 30%   Flexion: WFL  Left lateral flexion: Neck active lateral bend left: 80% with pain  Right lateral flexion: Neck active lateral bend right: 75% with pain  Left rotation: Neck active rotation left: 100%   Right rotation: Neck active rotation right: 75% with pain    Joint Play Hypomobile: C2, C3, C4, C5, C6, C7 and T1 Pain: C5, C6 and C7     Strength/Myotome Testing     Left Shoulder     Planes of Motion   Flexion: 4   Abduction: 4   Adduction: 4+   External rotation at 0°: 4   Internal rotation at 0°: 4     Right Shoulder     Planes of Motion   Flexion: 4   Abduction: 4   Adduction: 4+   External rotation at 0°: 4   Internal rotation at 0°: 4     Left Elbow   Flexion: 4+  Extension: 4+    Right Elbow   Flexion: 4+  Extension: 4+    Ambulation     Comments   R UE lacks arm swing, Minimal to nil trunk rotation during ambulation, decreased step length, absent push off and heel strike, widened base of support    Functional Assessment     Single Leg Stance   Left single leg stance time: Unable to perform without assist   Right single leg stance time: Unable to perform without assist           Precautions: MS    Daily Treatment Diary     Manual  10/23         10/18   Flexibility UE nerve glides 10x ea         B UT, scalenes, UE nerve glides 15x   STM B UT, suboccipital release, R levator scap release, R SCM STM         B UT, suboccipital release, R levator scap release, R SCM STM   Joint Mob PA mobs C/S Gr III, IV         PA mobs C/S Gr III, IV   ROM and MMT assessment 15 min                             Exercise Diary  10/23         10/18   Chin tucks 5"x15         5"x15   Scap squeezes 5"x15         5"x15   Posture Re-ed             UBE Rev/Fwd 4'/4'         Rev/Fwd 4'/4'   Pulleys          x4 min elevation   B UE ER          Green TB 2x15   B UE Abd          Green TB 2x15   Rows          Tcord 3x10 blue   Shoulder extension with scap squeeze          3" holdx 20, blue                                                                                                                                                      Modalities              CP

## 2018-10-23 NOTE — LETTER
2018    Anna Harden MD  [address] [facility] [address]  [address] [address]    Patient: Long Mendez   YOB: 1960   Date of Visit: 10/23/2018     Encounter Diagnosis     ICD-10-CM    1  Weakness generalized R53 1    2  Cervical radiculopathy M54 12        Dear Dr Margaux Hylton:    Please review the attached Plan of Care from [address] recent visit  Please verify that you agree therapy should continue by signing the attached document and sending it back to our office  If you have any questions or concerns, please don't hesitate to call  Sincerely,    Earnstine Goldmann, PT      Referring Provider:      I certify that I have read the below Plan of Care and certify the need for these services furnished under this plan of treatment while under my care  Anna Harden MD  [facility] [address]  [address] [facility] [address]  [address] [place] [address] Avenue: [phone]          PT Re-Evaluation     Today's date: 10/23/2018  Patient name: Long Mendez  : 1960  MRN: [id number]  Referring provider: Angelika Silvestre MD  Dx:   Encounter Diagnosis     ICD-10-CM    1  Weakness generalized R53 1    2  Cervical radiculopathy M54 12                   Assessment  Impairments: abnormal coordination, abnormal gait, abnormal muscle tone, abnormal or restricted ROM, activity intolerance, impaired balance, impaired physical strength, lacks appropriate home exercise program, pain with function, poor posture  and poor body mechanics    Assessment details: Long Mendez has made significant progress since beginning PT treatment and has attended 11 sessions since her evaluation on 18  She demonstrates improving degrees of pain, but continues to have decreased strength, decreased cervical ROM, decreased cervical joint mobility, intermittent impaired sensation, ambulatory dysfunction and postural  dysfunction    Recommend that she may benefit from continuing PT in this setting 2x/wk in order to address the aforementioned impairments and restore Her ability to perform all prior activities without pain or difficulty  Should you have any questions regarding this patient's care, please contact [phone]    Thank you for your referral      Understanding of Dx/Px/POC: good   Prognosis: good    Goals  Short Term Goals to be completed within 3 weeks  Patient to demo good seated and standing posture without cues from PT 50% of the time  Patient able to improve ROM grossly by 15%  Patient able to demo correct transfer techniques with good body mechanics and min cues from PT  Patient able to tolerate sitting for 20 minutes without complaints of pain    Long Term Goals to be completed within 6 weeks  Patient to demo good seated and standing posture without cues from PT 80% of the time  Patient able to improve ROM grossly by 30%  Patient able to demo correct transfer techniques with good body mechanics and min cues from PT  Patient able to tolerate sitting for 40 minutes without complaints of pain  Patient to be independent with HEP  Patient's FOTO score to improve to 61/100      Plan  Patient would benefit from: skilled physical therapy  Planned modality interventions: thermotherapy: hydrocollator packs, cryotherapy and unattended electrical stimulation  Planned therapy interventions: manual therapy, joint mobilization, abdominal trunk stabilization, activity modification, balance, motor coordination training, neuromuscular re-education, patient education, postural training, body mechanics training, behavior modification, strengthening, stretching, therapeutic exercise, therapeutic activities, flexibility, functional ROM exercises and home exercise program  Frequency: 2x week  Duration in visits: 12  Duration in weeks: 6  Plan of Care beginning date: 10/16/2018  Plan of Care expiration date: 11/30/2018  Treatment plan discussed with: patient        Subjective Evaluation    History of Present Illness  Date of onset: 6/4/2018  Mechanism of injury: Patient reports that she feels that physical therapy has helped her to this point  She now has more sensation in her R hand, though still feels "heaviness" and weakness in her R side in general but even this is improved somewhat  She states that she still feels at least 50% better since she began treatment and is happy with her progress  She would like to continue PT treatment at this time  Recurrent probem    Quality of life: good    Pain  Current pain ratin  At best pain ratin  At worst pain ratin  Location: neck and R arm, though it is very occasional  Quality: radiating, throbbing and tight  Relieving factors: change in position, ice, relaxation, rest and medications  Aggravating factors: sitting and keyboarding (Having to sit at her computer for hours at a time)    Social Support  Lives in: condominium  Lives with: alone    Employment status: working  Exercise history: Patient has had PT in past which has helped      Diagnostic Tests  No diagnostic tests performed  Abnormal MRI: "pinched nerve" in her neck from MRI within past year, but no recent imaging  FCE comments: No recent imaging performed, but has had MRI of brain and spine within past year that showed, per patient, "pinched nerves"Treatments  Previous treatment: physical therapy  Current treatment: physical therapy  Patient Goals  Patient goals for therapy: increased strength and decreased pain          Objective     Postural Observations  Seated posture: poor  Standing posture: poor  Correction of posture: makes symptoms better    Additional Postural Observation Details  (+)Dowager's humtrevon    Neurological Testing     Sensation   Cervical/Thoracic   Left   Intact: light touch    Right   Diminished: light touch    Comments   Right light touch: Able to identify areas touched but it is lessened as compared to L side       Active Range of Motion   Cervical/Thoracic Spine   Cervical  Subcranial protraction: Active cervical subcranial protraction: 30%   Flexion: WFL  Left lateral flexion: Neck active lateral bend left: 80% with pain  Right lateral flexion: Neck active lateral bend right: 75% with pain  Left rotation: Neck active rotation left: 100%   Right rotation: Neck active rotation right: 75% with pain    Joint Play Hypomobile: C2, C3, C4, C5, C6, C7 and T1 Pain: C5, C6 and C7     Strength/Myotome Testing     Left Shoulder     Planes of Motion   Flexion: 4   Abduction: 4   Adduction: 4+   External rotation at 0°:  4   Internal rotation at 0°:  4     Right Shoulder     Planes of Motion   Flexion: 4   Abduction: 4   Adduction: 4+   External rotation at 0°:  4   Internal rotation at 0°:  4     Left Elbow   Flexion: 4+  Extension: 4+    Right Elbow   Flexion: 4+  Extension: 4+    Ambulation     Comments   R UE lacks arm swing, Minimal to nil trunk rotation during ambulation, decreased step length, absent push off and heel strike, widened base of support    Functional Assessment     Single Leg Stance   Left single leg stance time: Unable to perform without assist   Right single leg stance time: Unable to perform without assist           Precautions: MS    Daily Treatment Diary     Manual  10/23         10/18   Flexibility UE nerve glides 10x ea         B UT, scalenes, UE nerve glides 15x   STM B UT, suboccipital release, R levator scap release, R SCM STM         B UT, suboccipital release, R levator scap release, R SCM STM   Joint Mob PA mobs C/S Gr III, IV         PA mobs C/S Gr III, IV   ROM and MMT assessment 15 min                             Exercise Diary  10/23         10/18   Chin tucks 5"x15         5"x15   Scap squeezes 5"x15         5"x15   Posture Re-ed             UBE Rev/Fwd 4'/4'         Rev/Fwd 4'/4'   Pulleys          x4 min elevation   B UE ER          Green TB 2x15   B UE Abd          Green TB 2x15   Rows          Tcord 3x10 blue   Shoulder extension with scap squeeze          3" holdx 20, blue Modalities              CP

## 2018-10-25 ENCOUNTER — APPOINTMENT (OUTPATIENT)
Dept: PHYSICAL THERAPY | Facility: CLINIC | Age: 58
End: 2018-10-25
Payer: COMMERCIAL

## 2018-10-30 ENCOUNTER — APPOINTMENT (OUTPATIENT)
Dept: PHYSICAL THERAPY | Facility: CLINIC | Age: 58
End: 2018-10-30
Payer: COMMERCIAL

## 2018-11-01 ENCOUNTER — APPOINTMENT (OUTPATIENT)
Dept: PHYSICAL THERAPY | Facility: CLINIC | Age: 58
End: 2018-11-01
Payer: COMMERCIAL

## 2018-11-06 ENCOUNTER — OFFICE VISIT (OUTPATIENT)
Dept: PHYSICAL THERAPY | Facility: CLINIC | Age: 58
End: 2018-11-06
Payer: COMMERCIAL

## 2018-11-06 DIAGNOSIS — M54.12 CERVICAL RADICULOPATHY: ICD-10-CM

## 2018-11-06 DIAGNOSIS — R53.1 WEAKNESS GENERALIZED: Primary | ICD-10-CM

## 2018-11-06 PROCEDURE — 97140 MANUAL THERAPY 1/> REGIONS: CPT

## 2018-11-06 PROCEDURE — 97110 THERAPEUTIC EXERCISES: CPT

## 2018-11-06 NOTE — PROGRESS NOTES
Daily Note     Today's date: 2018  Patient name: Elisa Wellington  : 1960  MRN: [id number]  Referring provider: Anoop Jacobsen MD  Dx:   Encounter Diagnoses   Name Primary?  Weakness generalized Yes    Cervical radiculopathy                   Subjective: Pt reports 8/10 pain in lateral right side of neck  States the tingling in her left hand continues to decrease  Objective: See treatment diary below    Precautions: MS    Daily Treatment Diary     Manual  10/23 11/6        10/18   Flexibility UE nerve glides 10x ea UE nerve glides 10x ea        B UT, scalenes, UE nerve glides 15x   STM B UT, suboccipital release, R levator scap release, R SCM STM B UT, suboccipital release, R levator scap release, R SCM STM        B UT, suboccipital release, R levator scap release, R SCM STM   Joint Mob PA mobs C/S Gr III, IV PA mobs C/S Gr III, IV        PA mobs C/S Gr III, IV   ROM and MMT assessment 15 min                             Exercise Diary  10/23 11/6        10/18   Chin tucks 5"x15 5"x15        5"x15   Scap squeezes 5"x15 5"x15        5"x15   Posture Re-ed             UBE Rev/Fwd 4'/4' Rev/Fwd 4'/4'        Rev/Fwd 4'/4'   Pulleys  x4 min elevation        x4 min elevation   B UE ER  Green TB 2x15        Green TB 2x15   B UE Abd          Green TB 2x15   Rows  Tcord 2x15 blue        Tcord 3x10 blue   Shoulder extension with scap squeeze  3" holdx 20, yellow        3" holdx 20, blue                                                                                                                                                      Modalities              CP                                           Assessment: Pt tolerated treatment well with minimal complaints of pain  Pt presents with tenderness upon palpation of right UT and suboccipitals, decreased with STM and accupressure  Cueing for posture given throughout session          Plan: Continue with plan of care to decrease pain, improve mobility, strength, and function

## 2018-11-08 ENCOUNTER — OFFICE VISIT (OUTPATIENT)
Dept: PHYSICAL THERAPY | Facility: CLINIC | Age: 58
End: 2018-11-08
Payer: COMMERCIAL

## 2018-11-08 DIAGNOSIS — R53.1 WEAKNESS GENERALIZED: Primary | ICD-10-CM

## 2018-11-08 DIAGNOSIS — M54.12 CERVICAL RADICULOPATHY: ICD-10-CM

## 2018-11-08 PROCEDURE — 97140 MANUAL THERAPY 1/> REGIONS: CPT

## 2018-11-08 PROCEDURE — 97110 THERAPEUTIC EXERCISES: CPT

## 2018-11-08 NOTE — PROGRESS NOTES
Daily Note     Today's date: 2018  Patient name: Long Mendez  : 1960  MRN: [id number]  Referring provider: Angelika Silvestre MD  Dx:   Encounter Diagnosis     ICD-10-CM    1  Weakness generalized R53 1    2  Cervical radiculopathy M54 12                   Subjective: Patient reports that she has some neck pain today, sometimes it is sharp in quality, but the sensation in her R hand feels good (and not numb)  Objective: See treatment diary below  Daily Treatment Diary     Manual  10/23 11/6 11/8       10/18   Flexibility UE nerve glides 10x ea UE nerve glides 10x ea UE nerve glides 10x ea       B UT, scalenes, UE nerve glides 15x   STM B UT, suboccipital release, R levator scap release, R SCM STM B UT, suboccipital release, R levator scap release, R SCM STM B UT, suboccipital release, R levator scap release, R SCM STM       B UT, suboccipital release, R levator scap release, R SCM STM   Joint Mob PA mobs C/S Gr III, IV PA mobs C/S Gr III, IV PA mobs C/S Gr III, IV       PA mobs C/S Gr III, IV   ROM and MMT assessment 15 min                             Exercise Diary  10/23 11/6 11/8       10/18   Chin tucks 5"x15 5"x15        5"x15   Scap squeezes 5"x15 5"x15        5"x15   Posture Re-ed             UBE Rev/Fwd 4'/4' Rev/Fwd 4'/4' Rev/Fwd 4'/4'       Rev/Fwd 4'/4'   Pulleys  x4 min elevation Green TB 2x15       x4 min elevation   B UE ER  Green TB 2x15 Green TB 2x15       Green TB 2x15   B UE Abd   Green TB 2x15       Green TB 2x15   Rows  Tcord 2x15 blue Tcord 2x15 blue       Tcord 3x10 blue   Shoulder extension with scap squeeze  3" holdx 20, yellow 3" holdx 20, yellow       3" holdx 20, blue                                                                                                                                                      Modalities              CP                                             Assessment: Tolerated treatment well and without distress    She preferred seated position today for manual treatment due to her neck discomfort, but reported decreased feeling of tightness by end of session  Discomfort noted with R rotation and she has significant TTP to R UT, scalene groups, addressed with ischemic release  Patient demonstrated fatigue post treatment and would benefit from continued PT in this setting  Plan: Continue per plan of care

## 2018-11-13 ENCOUNTER — APPOINTMENT (OUTPATIENT)
Dept: PHYSICAL THERAPY | Facility: CLINIC | Age: 58
End: 2018-11-13
Payer: COMMERCIAL

## 2018-11-15 ENCOUNTER — APPOINTMENT (OUTPATIENT)
Dept: PHYSICAL THERAPY | Facility: CLINIC | Age: 58
End: 2018-11-15
Payer: COMMERCIAL

## 2018-11-15 ENCOUNTER — OFFICE VISIT (OUTPATIENT)
Dept: PHYSICAL THERAPY | Facility: CLINIC | Age: 58
End: 2018-11-15
Payer: COMMERCIAL

## 2018-11-15 DIAGNOSIS — R53.1 WEAKNESS GENERALIZED: ICD-10-CM

## 2018-11-15 DIAGNOSIS — M54.12 CERVICAL RADICULOPATHY: Primary | ICD-10-CM

## 2018-11-15 PROCEDURE — 97140 MANUAL THERAPY 1/> REGIONS: CPT

## 2018-11-15 PROCEDURE — 97110 THERAPEUTIC EXERCISES: CPT

## 2018-11-15 NOTE — PROGRESS NOTES
Daily Note     Today's date: 11/15/2018  Patient name: Edie Brittle  : 1960  MRN: [id number]  Referring provider: Erika Adame MD  Dx:   Encounter Diagnosis     ICD-10-CM    1  Cervical radiculopathy M54 12    2  Weakness generalized R53 1                   Subjective: Patient reports that she is feeling ok today, continues to have steady improvements week to week, improving sensation for longer periods of time in her R hand   Pain reported as /10 on arrival        Objective: See treatment diary below    Precautions: MS  Daily Treatment Diary     Manual  10/23 11/6 11/8 11/15      10/18   Flexibility UE nerve glides 10x ea UE nerve glides 10x ea UE nerve glides 10x ea UE nerve glides 10x ea      B UT, scalenes, UE nerve glides 15x   STM B UT, suboccipital release, R levator scap release, R SCM STM B UT, suboccipital release, R levator scap release, R SCM STM B UT, suboccipital release, R levator scap release, R SCM STM B UT, suboccipital release, R levator scap release, R SCM STM      B UT, suboccipital release, R levator scap release, R SCM STM   Joint Mob PA mobs C/S Gr III, IV PA mobs C/S Gr III, IV PA mobs C/S Gr III, IV PA mobs C/S Gr III, IV      PA mobs C/S Gr III, IV   ROM and MMT assessment 15 min                             Exercise Diary  10/23 11/6 11/8 11/15      10/18   Chin tucks 5"x15 5"x15        5"x15   Scap squeezes 5"x15 5"x15        5"x15   Posture Re-ed             UBE Rev/Fwd 4'/4' Rev/Fwd 4'/4' Rev/Fwd 4'/4' Rev/Fwd 4'/4'      Rev/Fwd 4'/4'   Pulleys  x4 min elevation Green TB 2x15 Green TB 2x15      x4 min elevation   B UE ER  Green TB 2x15 Green TB 2x15 Green TB 2x15      Green TB 2x15   B UE Abd   Green TB 2x15 Green TB 2x15      Green TB 2x15   Rows  Tcord 2x15 blue Tcord 2x15 blue Tcord 2x15  Blue      Tcord 3x10 blue   Shoulder extension with scap squeeze  3" holdx 20, yellow 3" holdx 20, yellow 3" holdx 20, yellow      3" holdx 20, blue Modalities              CP                                           Assessment: Tolerated treatment well and without distress  Patient demonstrated fatigue post treatment, exhibited good technique with therapeutic exercises and would benefit from continued PT in this setting to continue progressive strengthening of posture and correction of functional movement patterns  Plan: Continue per plan of care

## 2018-11-20 ENCOUNTER — APPOINTMENT (OUTPATIENT)
Dept: PHYSICAL THERAPY | Facility: CLINIC | Age: 58
End: 2018-11-20
Payer: COMMERCIAL

## 2018-11-20 NOTE — PROGRESS NOTES
Daily Note     Today's date: 2018  Patient name: Amrita Wise  : 1960  MRN: [id number]  Referring provider: Ginger Perez MD  Dx:   Encounter Diagnoses   Name Primary?     Cervical radiculopathy Yes    Weakness generalized                   Subjective: Pt reports       Objective: See treatment diary below    Precautions: MS  Daily Treatment Diary     Manual  10/23 11/6 11/8 11/15 11/20     10/18   Flexibility UE nerve glides 10x ea UE nerve glides 10x ea UE nerve glides 10x ea UE nerve glides 10x ea UE nerve glides 10x ea     B UT, scalenes, UE nerve glides 15x   STM B UT, suboccipital release, R levator scap release, R SCM STM B UT, suboccipital release, R levator scap release, R SCM STM B UT, suboccipital release, R levator scap release, R SCM STM B UT, suboccipital release, R levator scap release, R SCM STM B UT, suboccipital release, R levator scap release, R SCM STM     B UT, suboccipital release, R levator scap release, R SCM STM   Joint Mob PA mobs C/S Gr III, IV PA mobs C/S Gr III, IV PA mobs C/S Gr III, IV PA mobs C/S Gr III, IV PA mobs C/S Gr III, IV     PA mobs C/S Gr III, IV   ROM and MMT assessment 15 min                             Exercise Diary  10/23 11/6 11/8 11/15 11/20     10/18   Chin tucks 5"x15 5"x15        5"x15   Scap squeezes 5"x15 5"x15        5"x15   Posture Re-ed             UBE Rev/Fwd 4'/4' Rev/Fwd 4'/4' Rev/Fwd 4'/4' Rev/Fwd 4'/4' Rev/Fwd 4'/4'     Rev/Fwd 4'/4'   Pulleys  x4 min elevation Green TB 2x15 Green TB 2x15 x4 min elevation     x4 min elevation   B UE ER  Green TB 2x15 Green TB 2x15 Green TB 2x15 Green TB 2x15     Green TB 2x15   B UE Abd   Green TB 2x15 Green TB 2x15 Green TB 2x15     Green TB 2x15   Rows  Tcord 2x15 blue Tcord 2x15 blue Tcord 2x15  Blue Tcord 2x15  Blue     Tcord 3x10 blue   Shoulder extension with scap squeeze  3" holdx 20, yellow 3" holdx 20, yellow 3" holdx 20, yellow 3" holdx 20, yellow     3" holdx 20, blue Modalities              CP                                           Assessment: {ASSESSMENT:10265}        Plan: Continue with plan of care to decrease pain, improve mobility, strength, and function

## 2018-11-27 ENCOUNTER — TRANSCRIBE ORDERS (OUTPATIENT)
Dept: PHYSICAL THERAPY | Facility: CLINIC | Age: 58
End: 2018-11-27

## 2018-11-27 ENCOUNTER — OFFICE VISIT (OUTPATIENT)
Dept: PHYSICAL THERAPY | Facility: CLINIC | Age: 58
End: 2018-11-27
Payer: COMMERCIAL

## 2018-11-27 DIAGNOSIS — M54.12 BRACHIAL NEURALGIA: ICD-10-CM

## 2018-11-27 DIAGNOSIS — M54.12 CERVICAL RADICULOPATHY: Primary | ICD-10-CM

## 2018-11-27 DIAGNOSIS — M54.12 BRACHIAL NEURITIS: Primary | ICD-10-CM

## 2018-11-27 DIAGNOSIS — R53.1 WEAKNESS GENERALIZED: ICD-10-CM

## 2018-11-27 PROCEDURE — 97110 THERAPEUTIC EXERCISES: CPT

## 2018-11-27 PROCEDURE — G8984 CARRY CURRENT STATUS: HCPCS

## 2018-11-27 PROCEDURE — 97140 MANUAL THERAPY 1/> REGIONS: CPT

## 2018-11-27 PROCEDURE — 97112 NEUROMUSCULAR REEDUCATION: CPT

## 2018-11-27 PROCEDURE — G8985 CARRY GOAL STATUS: HCPCS

## 2018-11-27 NOTE — LETTER
2018    Leo Phelps MD  [address] [facility] [address]  [address] [facility] [address]    Patient: Tulio Lane   YOB: 1960   Date of Visit: 2018     Encounter Diagnosis     ICD-10-CM    1  Cervical radiculopathy M54 12    2  Weakness generalized R53 1        Dear Dr Nina Kohler:    Please review the attached Plan of Care from [address] recent visit  Please verify that you agree therapy should continue by signing the attached document and sending it back to our office  If you have any questions or concerns, please don't hesitate to call  Sincerely,    Anu Parker, PT      Referring Provider:      I certify that I have read the below Plan of Care and certify the need for these services furnished under this plan of treatment while under my care  Leo Phelps MD  [facility] [address]  [address] [facility] [address]  [address] [place] [address] Avenue: [phone]          PT Re-Evaluation     Today's date: 2018  Patient name: Tulio Lane  : 1960  MRN: [id number]  Referring provider: Tana Gavin MD  Dx:   Encounter Diagnosis     ICD-10-CM    1  Cervical radiculopathy M54 12    2  Weakness generalized R53 1                   Assessment  Assessment details: Tulio Lane has made significant progress since beginning PT treatment and has attended 15 sessions since her evaluation on 18  She demonstrates improving sensation in the R hand, but now has increased pain levels in the R side of her neck and scapula and continues to have decreased strength, decreased cervical ROM, decreased cervical joint mobility, intermittent impaired sensation, ambulatory dysfunction and postural  dysfunction  Recommend that she may benefit from continuing PT in this setting 2x/wk in order to address the aforementioned impairments and restore Her ability to perform all prior activities without pain or difficulty  Should you have any questions regarding this patient's care, please contact [phone]    Thank you for your referral      Impairments: abnormal coordination, abnormal gait, abnormal muscle tone, abnormal or restricted ROM, activity intolerance, impaired balance, impaired physical strength, lacks appropriate home exercise program, pain with function, poor posture  and poor body mechanics  Understanding of Dx/Px/POC: good   Prognosis: good    Goals  Short Term Goals to be completed within 3 weeks  Patient to demo good seated and standing posture without cues from PT 50% of the time  Patient able to improve ROM grossly by 15%  Patient able to demo correct transfer techniques with good body mechanics and min cues from PT  Patient able to tolerate sitting for 20 minutes without complaints of pain    Long Term Goals to be completed within 6 weeks  Patient to demo good seated and standing posture without cues from PT 80% of the time  Patient able to improve ROM grossly by 30%  Patient able to demo correct transfer techniques with good body mechanics and min cues from PT  Patient able to tolerate sitting for 40 minutes without complaints of pain  Patient to be independent with HEP  Patient's FOTO score to improve to 61/100      Plan  Patient would benefit from: skilled physical therapy  Planned modality interventions: thermotherapy: hydrocollator packs, cryotherapy and unattended electrical stimulation  Planned therapy interventions: manual therapy, joint mobilization, abdominal trunk stabilization, activity modification, balance, motor coordination training, neuromuscular re-education, patient education, postural training, body mechanics training, behavior modification, strengthening, stretching, therapeutic exercise, therapeutic activities, flexibility, functional ROM exercises and home exercise program  Frequency: 2x week  Duration in visits: 12  Duration in weeks: 6  Plan of Care beginning date: 10/16/2018  Plan of Care expiration date: 11/30/2018  Treatment plan discussed with: patient        Subjective Evaluation    History of Present Illness  Date of onset: 2018  Mechanism of injury: Patient reports that she feels that physical therapy has helped her to this point  She now has more sensation in her R hand, though still feels significant pain in the R side of her neck lately that goes into her R scapula  She would like to continue PT treatment at this time to further decrease her neck and R UE pain in addition to beginning to work more on her balance and general strength  Recurrent probem    Quality of life: good    Pain  Current pain ratin  At best pain ratin  At worst pain ratin  Location: neck and R scapula  Quality: radiating, throbbing and tight  Relieving factors: change in position, ice, relaxation, rest and medications  Aggravating factors: sitting and keyboarding (Having to sit at her computer for hours at a time)    Social Support  Lives in: condominium  Lives with: alone    Employment status: working  Exercise history: Patient has had PT in past which has helped      Diagnostic Tests  No diagnostic tests performed  Abnormal MRI: "pinched nerve" in her neck from MRI within past year, but no recent imaging  FCE comments: No recent imaging performed, but has had MRI of brain and spine within past year that showed, per patient, "pinched nerves"Treatments  Previous treatment: physical therapy  Current treatment: physical therapy  Patient Goals  Patient goals for therapy: increased strength and decreased pain          Objective     Postural Observations  Seated posture: fair  Standing posture: fair  Correction of posture: makes symptoms better    Additional Postural Observation Details  (+)Dowager's hump    Neurological Testing     Sensation   Cervical/Thoracic   Left   Intact: light touch    Right   Diminished: light touch    Comments   Right light touch: Able to identify areas touched but it is lessened as compared to L side       Active Range of Motion   Cervical/Thoracic Spine Cervical  Subcranial protraction: Active cervical subcranial protraction: 30%   Flexion: WFL  Left lateral flexion: Neck active lateral bend left: 100% WFL  Right lateral flexion: Neck active lateral bend right: 75% with pain  Left rotation: Neck active rotation left: 100% WFL  Right rotation: Neck active rotation right: 75% with pain    Joint Play Hypomobile: C2, C3, C4, C5, C6, C7 and T1 Pain: C5, C6 and C7     Strength/Myotome Testing     Left Shoulder     Planes of Motion   Flexion: 4   Abduction: 4   Adduction: 4+   External rotation at 0°:  4   Internal rotation at 0°:  4     Right Shoulder     Planes of Motion   Flexion: 4   Abduction: 4   Adduction: 4+   External rotation at 0°:  4   Internal rotation at 0°:  4     Left Elbow   Flexion: 4+  Extension: 4+    Right Elbow   Flexion: 4+  Extension: 4+    Ambulation     Comments   R UE lacks arm swing, Minimal to nil trunk rotation during ambulation, decreased step length, absent push off and heel strike, widened base of support    Functional Assessment     Single Leg Stance   Left single leg stance time: Unable to perform without assist   Right single leg stance time: Unable to perform without assist           Precautions: MS    Daily Treatment Diary     Manual  10/23 11/6 11/8 11/15 11/27     10/18   Flexibility UE nerve glides 10x ea UE nerve glides 10x ea UE nerve glides 10x ea UE nerve glides 10x ea UE nerve glides 10x ea     B UT, scalenes, UE nerve glides 15x   STM B UT, suboccipital release, R levator scap release, R SCM STM B UT, suboccipital release, R levator scap release, R SCM STM B UT, suboccipital release, R levator scap release, R SCM STM B UT, suboccipital release, R levator scap release, R SCM STM B UT, suboccipital release, R levator scap release, R SCM STM     B UT, suboccipital release, R levator scap release, R SCM STM   Joint Mob PA mobs C/S Gr III, IV PA mobs C/S Gr III, IV PA mobs C/S Gr III, IV PA mobs C/S Gr III, IV PA mobs C/S Gr III, IV     PA mobs C/S Gr III, IV   ROM and MMT assessment 15 min    15 min                         Exercise Diary  10/23 11/6 11/8 11/15 11/27     10/18   Chin tucks 5"x15 5"x15        5"x15   Scap squeezes 5"x15 5"x15        5"x15   Posture Re-ed             UBE Rev/Fwd 4'/4' Rev/Fwd 4'/4' Rev/Fwd 4'/4' Rev/Fwd 4'/4' Rev/Fwd 4'/4'     Rev/Fwd 4'/4'   Pulleys  x4 min elevation x4 min elevation x4 min elevation x4 min elevation     x4 min elevation   B UE ER  Green TB 2x15 Green TB 2x15 Green TB 2x15 Green TB 2x15     Green TB 2x15   B UE Abd   Green TB 2x15 Green TB 2x15 Green TB 2x15     Green TB 2x15   Rows  Tcord 2x15 blue Tcord 2x15 blue Tcord 2x15  Blue NT     Tcord 3x10 blue   Shoulder extension with scap squeeze  3" holdx 20, yellow 3" holdx 20, yellow 3" holdx 20, yellow NT     3" holdx 20, blue                                                                                                                                                      Modalities              CP

## 2018-11-27 NOTE — LETTER
2018    Corinne Espazra MD  [address] [facility] [address]  [address] [facility] [address]    Patient: Rochelle Warner   YOB: 1960   Date of Visit: 2018     Encounter Diagnosis     ICD-10-CM    1  Cervical radiculopathy M54 12    2  Weakness generalized R53 1        Dear Dr Castillo Breed:    Please review the attached Plan of Care from [address] recent visit  Please verify that you agree therapy should continue by signing the attached document and sending it back to our office  If you have any questions or concerns, please don't hesitate to call  Sincerely,    Jarrell Collier, PT      Referring Provider:      I certify that I have read the below Plan of Care and certify the need for these services furnished under this plan of treatment while under my care  Corinne Esparza MD  [facility] [address]  [address] [facility] [address]  [address]: [phone]          PT Re-Evaluation     Today's date: 2018  Patient name: Rochelle Warner  : 1960  MRN: [id number]  Referring provider: Sujit Case MD  Dx:   Encounter Diagnosis     ICD-10-CM    1  Cervical radiculopathy M54 12    2  Weakness generalized R53 1                   Assessment  Assessment details: Rochelle Warner has made significant progress since beginning PT treatment and has attended 15 sessions since her evaluation on 18  She demonstrates improving sensation in the R hand, but now has increased pain levels in the R side of her neck and scapula and continues to have decreased strength, decreased cervical ROM, decreased cervical joint mobility, intermittent impaired sensation, ambulatory dysfunction and postural  dysfunction  Recommend that she may benefit from continuing PT in this setting 2x/wk in order to address the aforementioned impairments and restore Her ability to perform all prior activities without pain or difficulty  Should you have any questions regarding this patient's care, please contact [phone]    Thank you for your referral      Impairments: abnormal coordination, abnormal gait, abnormal muscle tone, abnormal or restricted ROM, activity intolerance, impaired balance, impaired physical strength, lacks appropriate home exercise program, pain with function, poor posture  and poor body mechanics  Understanding of Dx/Px/POC: good   Prognosis: good    Goals  Short Term Goals to be completed within 3 weeks  Patient to demo good seated and standing posture without cues from PT 50% of the time  Patient able to improve ROM grossly by 15%  Patient able to demo correct transfer techniques with good body mechanics and min cues from PT  Patient able to tolerate sitting for 20 minutes without complaints of pain    Long Term Goals to be completed within 6 weeks  Patient to demo good seated and standing posture without cues from PT 80% of the time  Patient able to improve ROM grossly by 30%  Patient able to demo correct transfer techniques with good body mechanics and min cues from PT  Patient able to tolerate sitting for 40 minutes without complaints of pain  Patient to be independent with HEP  Patient's FOTO score to improve to 61/100      Plan  Patient would benefit from: skilled physical therapy  Planned modality interventions: thermotherapy: hydrocollator packs, cryotherapy and unattended electrical stimulation  Planned therapy interventions: manual therapy, joint mobilization, abdominal trunk stabilization, activity modification, balance, motor coordination training, neuromuscular re-education, patient education, postural training, body mechanics training, behavior modification, strengthening, stretching, therapeutic exercise, therapeutic activities, flexibility, functional ROM exercises and home exercise program  Frequency: 2x week  Duration in visits: 12  Duration in weeks: 6  Plan of Care beginning date: 11/27/2018  Plan of Care expiration date: 12/31/2018  Treatment plan discussed with: patient        Subjective Evaluation    History of Present Illness  Date of onset: 2018  Mechanism of injury: Patient reports that she feels that physical therapy has helped her to this point  She now has more sensation in her R hand, though still feels significant pain in the R side of her neck lately that goes into her R scapula  She would like to continue PT treatment at this time to further decrease her neck and R UE pain in addition to beginning to work more on her balance and general strength  Recurrent probem    Quality of life: good    Pain  Current pain ratin  At best pain ratin  At worst pain ratin  Location: neck and R scapula  Quality: radiating, throbbing and tight  Relieving factors: change in position, ice, relaxation, rest and medications  Aggravating factors: sitting and keyboarding (Having to sit at her computer for hours at a time)    Social Support  Lives in: condominium  Lives with: alone    Employment status: working  Exercise history: Patient has had PT in past which has helped      Diagnostic Tests  No diagnostic tests performed  Abnormal MRI: "pinched nerve" in her neck from MRI within past year, but no recent imaging  FCE comments: No recent imaging performed, but has had MRI of brain and spine within past year that showed, per patient, "pinched nerves"Treatments  Previous treatment: physical therapy  Current treatment: physical therapy  Patient Goals  Patient goals for therapy: increased strength and decreased pain          Objective     Postural Observations  Seated posture: fair  Standing posture: fair  Correction of posture: makes symptoms better    Additional Postural Observation Details  (+)Dowager's hump    Neurological Testing     Sensation   Cervical/Thoracic   Left   Intact: light touch    Right   Diminished: light touch    Comments   Right light touch: Able to identify areas touched but it is lessened as compared to L side       Active Range of Motion   Cervical/Thoracic Spine Cervical  Subcranial protraction: Active cervical subcranial protraction: 30%   Flexion: WFL  Left lateral flexion: Neck active lateral bend left: 100% WFL  Right lateral flexion: Neck active lateral bend right: 75% with pain  Left rotation: Neck active rotation left: 100% WFL  Right rotation: Neck active rotation right: 75% with pain    Joint Play Hypomobile: C2, C3, C4, C5, C6, C7 and T1 Pain: C5, C6 and C7     Strength/Myotome Testing     Left Shoulder     Planes of Motion   Flexion: 4   Abduction: 4   Adduction: 4+   External rotation at 0°:  4   Internal rotation at 0°:  4     Right Shoulder     Planes of Motion   Flexion: 4   Abduction: 4   Adduction: 4+   External rotation at 0°:  4   Internal rotation at 0°:  4     Left Elbow   Flexion: 4+  Extension: 4+    Right Elbow   Flexion: 4+  Extension: 4+    Ambulation     Comments   R UE lacks arm swing, Minimal to nil trunk rotation during ambulation, decreased step length, absent push off and heel strike, widened base of support    Functional Assessment     Single Leg Stance   Left single leg stance time: Unable to perform without assist   Right single leg stance time: Unable to perform without assist           Precautions: MS    Daily Treatment Diary     Manual  10/23 11/6 11/8 11/15 11/27     10/18   Flexibility UE nerve glides 10x ea UE nerve glides 10x ea UE nerve glides 10x ea UE nerve glides 10x ea UE nerve glides 10x ea     B UT, scalenes, UE nerve glides 15x   STM B UT, suboccipital release, R levator scap release, R SCM STM B UT, suboccipital release, R levator scap release, R SCM STM B UT, suboccipital release, R levator scap release, R SCM STM B UT, suboccipital release, R levator scap release, R SCM STM B UT, suboccipital release, R levator scap release, R SCM STM     B UT, suboccipital release, R levator scap release, R SCM STM   Joint Mob PA mobs C/S Gr III, IV PA mobs C/S Gr III, IV PA mobs C/S Gr III, IV PA mobs C/S Gr III, IV PA mobs C/S Gr III, IV     PA mobs C/S Gr III, IV   ROM and MMT assessment 15 min    15 min                         Exercise Diary  10/23 11/6 11/8 11/15 11/27     10/18   Chin tucks 5"x15 5"x15        5"x15   Scap squeezes 5"x15 5"x15        5"x15   Posture Re-ed             UBE Rev/Fwd 4'/4' Rev/Fwd 4'/4' Rev/Fwd 4'/4' Rev/Fwd 4'/4' Rev/Fwd 4'/4'     Rev/Fwd 4'/4'   Pulleys  x4 min elevation x4 min elevation x4 min elevation x4 min elevation     x4 min elevation   B UE ER  Green TB 2x15 Green TB 2x15 Green TB 2x15 Green TB 2x15     Green TB 2x15   B UE Abd   Green TB 2x15 Green TB 2x15 Green TB 2x15     Green TB 2x15   Rows  Tcord 2x15 blue Tcord 2x15 blue Tcord 2x15  Blue NT     Tcord 3x10 blue   Shoulder extension with scap squeeze  3" holdx 20, yellow 3" holdx 20, yellow 3" holdx 20, yellow NT     3" holdx 20, blue                                                                                                                                                      Modalities              CP

## 2018-11-27 NOTE — PROGRESS NOTES
PT Re-Evaluation     Today's date: 2018  Patient name: Rochelle Warner  : 1960  MRN: [id number]  Referring provider: Sujit Case MD  Dx:   Encounter Diagnosis     ICD-10-CM    1  Cervical radiculopathy M54 12    2  Weakness generalized R53 1                   Assessment  Assessment details: Rochelle Warner has made significant progress since beginning PT treatment and has attended 15 sessions since her evaluation on 18  She demonstrates improving sensation in the R hand, but now has increased pain levels in the R side of her neck and scapula and continues to have decreased strength, decreased cervical ROM, decreased cervical joint mobility, intermittent impaired sensation, ambulatory dysfunction and postural  dysfunction  Recommend that she may benefit from continuing PT in this setting 2x/wk in order to address the aforementioned impairments and restore Her ability to perform all prior activities without pain or difficulty  Should you have any questions regarding this patient's care, please contact [phone]    Thank you for your referral      Impairments: abnormal coordination, abnormal gait, abnormal muscle tone, abnormal or restricted ROM, activity intolerance, impaired balance, impaired physical strength, lacks appropriate home exercise program, pain with function, poor posture  and poor body mechanics  Understanding of Dx/Px/POC: good   Prognosis: good    Goals  Short Term Goals to be completed within 3 weeks  Patient to demo good seated and standing posture without cues from PT 50% of the time  Patient able to improve ROM grossly by 15%  Patient able to demo correct transfer techniques with good body mechanics and min cues from PT  Patient able to tolerate sitting for 20 minutes without complaints of pain    Long Term Goals to be completed within 6 weeks  Patient to demo good seated and standing posture without cues from PT 80% of the time  Patient able to improve ROM grossly by 30%  Patient able to demo correct transfer techniques with good body mechanics and min cues from PT  Patient able to tolerate sitting for 40 minutes without complaints of pain  Patient to be independent with HEP  Patient's FOTO score to improve to 61/100      Plan  Patient would benefit from: skilled physical therapy  Planned modality interventions: thermotherapy: hydrocollator packs, cryotherapy and unattended electrical stimulation  Planned therapy interventions: manual therapy, joint mobilization, abdominal trunk stabilization, activity modification, balance, motor coordination training, neuromuscular re-education, patient education, postural training, body mechanics training, behavior modification, strengthening, stretching, therapeutic exercise, therapeutic activities, flexibility, functional ROM exercises and home exercise program  Frequency: 2x week  Duration in visits: 12  Duration in weeks: 6  Plan of Care beginning date: 2018  Plan of Care expiration date: 2018  Treatment plan discussed with: patient    18: Patient never returned phones to schedule appointments once her new authorization came back approved  Due to time since last visit, she is being D/C'd from PT services at this time  Subjective Evaluation    History of Present Illness  Date of onset: 2018  Mechanism of injury: Patient reports that she feels that physical therapy has helped her to this point  She now has more sensation in her R hand, though still feels significant pain in the R side of her neck lately that goes into her R scapula  She would like to continue PT treatment at this time to further decrease her neck and R UE pain in addition to beginning to work more on her balance and general strength     Recurrent probem    Quality of life: good    Pain  Current pain ratin  At best pain ratin  At worst pain ratin  Location: neck and R scapula  Quality: radiating, throbbing and tight  Relieving factors: change in position, ice, relaxation, rest and medications  Aggravating factors: sitting and keyboarding (Having to sit at her computer for hours at a time)    Social Support  Lives in: condominium  Lives with: alone    Employment status: working  Exercise history: Patient has had PT in past which has helped      Diagnostic Tests  No diagnostic tests performed  Abnormal MRI: "pinched nerve" in her neck from MRI within past year, but no recent imaging  FCE comments: No recent imaging performed, but has had MRI of brain and spine within past year that showed, per patient, "pinched nerves"Treatments  Previous treatment: physical therapy  Current treatment: physical therapy  Patient Goals  Patient goals for therapy: increased strength and decreased pain          Objective     Postural Observations  Seated posture: fair  Standing posture: fair  Correction of posture: makes symptoms better    Additional Postural Observation Details  (+)Dowager's hump    Neurological Testing     Sensation   Cervical/Thoracic   Left   Intact: light touch    Right   Diminished: light touch    Comments   Right light touch: Able to identify areas touched but it is lessened as compared to L side       Active Range of Motion   Cervical/Thoracic Spine   Cervical  Subcranial protraction: Active cervical subcranial protraction: 30%   Flexion: WFL  Left lateral flexion: Neck active lateral bend left: 100% WFL  Right lateral flexion: Neck active lateral bend right: 75% with pain  Left rotation: Neck active rotation left: 100% WFL  Right rotation: Neck active rotation right: 75% with pain    Joint Play Hypomobile: C2, C3, C4, C5, C6, C7 and T1 Pain: C5, C6 and C7     Strength/Myotome Testing     Left Shoulder     Planes of Motion   Flexion: 4   Abduction: 4   Adduction: 4+   External rotation at 0°: 4   Internal rotation at 0°: 4     Right Shoulder     Planes of Motion   Flexion: 4   Abduction: 4   Adduction: 4+   External rotation at 0°: 4   Internal rotation at 0°: 4     Left Elbow   Flexion: 4+  Extension: 4+    Right Elbow   Flexion: 4+  Extension: 4+    Ambulation     Comments   R UE lacks arm swing, Minimal to nil trunk rotation during ambulation, decreased step length, absent push off and heel strike, widened base of support    Functional Assessment     Single Leg Stance   Left single leg stance time: Unable to perform without assist   Right single leg stance time: Unable to perform without assist           Precautions: MS    Daily Treatment Diary     Manual  10/23 11/6 11/8 11/15 11/27     10/18   Flexibility UE nerve glides 10x ea UE nerve glides 10x ea UE nerve glides 10x ea UE nerve glides 10x ea UE nerve glides 10x ea     B UT, scalenes, UE nerve glides 15x   STM B UT, suboccipital release, R levator scap release, R SCM STM B UT, suboccipital release, R levator scap release, R SCM STM B UT, suboccipital release, R levator scap release, R SCM STM B UT, suboccipital release, R levator scap release, R SCM STM B UT, suboccipital release, R levator scap release, R SCM STM     B UT, suboccipital release, R levator scap release, R SCM STM   Joint Mob PA mobs C/S Gr III, IV PA mobs C/S Gr III, IV PA mobs C/S Gr III, IV PA mobs C/S Gr III, IV PA mobs C/S Gr III, IV     PA mobs C/S Gr III, IV   ROM and MMT assessment 15 min    15 min                         Exercise Diary  10/23 11/6 11/8 11/15 11/27     10/18   Chin tucks 5"x15 5"x15        5"x15   Scap squeezes 5"x15 5"x15        5"x15   Posture Re-ed             UBE Rev/Fwd 4'/4' Rev/Fwd 4'/4' Rev/Fwd 4'/4' Rev/Fwd 4'/4' Rev/Fwd 4'/4'     Rev/Fwd 4'/4'   Pulleys  x4 min elevation x4 min elevation x4 min elevation x4 min elevation     x4 min elevation   B UE ER  Green TB 2x15 Green TB 2x15 Green TB 2x15 Green TB 2x15     Green TB 2x15   B UE Abd   Green TB 2x15 Green TB 2x15 Green TB 2x15     Green TB 2x15   Rows  Tcord 2x15 blue Tcord 2x15 blue Tcord 2x15  Blue NT     Tcord 3x10 blue   Shoulder extension with scap squeeze  3" holdx 20, yellow 3" holdx 20, yellow 3" holdx 20, yellow NT     3" holdx 20, blue                                                                                                                                                      Modalities              CP

## 2018-11-29 ENCOUNTER — APPOINTMENT (OUTPATIENT)
Dept: PHYSICAL THERAPY | Facility: CLINIC | Age: 58
End: 2018-11-29
Payer: COMMERCIAL